# Patient Record
Sex: FEMALE | Race: WHITE | NOT HISPANIC OR LATINO | URBAN - METROPOLITAN AREA
[De-identification: names, ages, dates, MRNs, and addresses within clinical notes are randomized per-mention and may not be internally consistent; named-entity substitution may affect disease eponyms.]

---

## 2019-09-29 ENCOUNTER — EMERGENCY (EMERGENCY)
Facility: HOSPITAL | Age: 79
LOS: 1 days | Discharge: HOME | End: 2019-09-29
Attending: EMERGENCY MEDICINE
Payer: MEDICARE

## 2019-09-29 VITALS
DIASTOLIC BLOOD PRESSURE: 77 MMHG | OXYGEN SATURATION: 97 % | RESPIRATION RATE: 18 BRPM | SYSTOLIC BLOOD PRESSURE: 130 MMHG | TEMPERATURE: 98 F | HEART RATE: 67 BPM

## 2019-09-29 VITALS
OXYGEN SATURATION: 97 % | SYSTOLIC BLOOD PRESSURE: 136 MMHG | DIASTOLIC BLOOD PRESSURE: 76 MMHG | RESPIRATION RATE: 18 BRPM | WEIGHT: 126.1 LBS | TEMPERATURE: 98 F | HEART RATE: 64 BPM | HEIGHT: 65 IN

## 2019-09-29 LAB
ALBUMIN SERPL ELPH-MCNC: 4.4 G/DL — SIGNIFICANT CHANGE UP (ref 3.3–5)
ALP SERPL-CCNC: 95 U/L — SIGNIFICANT CHANGE UP (ref 40–120)
ALT FLD-CCNC: 17 U/L — SIGNIFICANT CHANGE UP (ref 10–45)
ANION GAP SERPL CALC-SCNC: 10 MMOL/L — SIGNIFICANT CHANGE UP (ref 5–17)
APTT BLD: 30.9 SEC — SIGNIFICANT CHANGE UP (ref 27.5–36.3)
AST SERPL-CCNC: 25 U/L — SIGNIFICANT CHANGE UP (ref 10–40)
BASOPHILS # BLD AUTO: 0 K/UL — SIGNIFICANT CHANGE UP (ref 0–0.2)
BASOPHILS NFR BLD AUTO: 0.8 % — SIGNIFICANT CHANGE UP (ref 0–2)
BILIRUB SERPL-MCNC: 0.2 MG/DL — SIGNIFICANT CHANGE UP (ref 0.2–1.2)
BUN SERPL-MCNC: 24 MG/DL — HIGH (ref 7–23)
CALCIUM SERPL-MCNC: 9.8 MG/DL — SIGNIFICANT CHANGE UP (ref 8.4–10.5)
CHLORIDE SERPL-SCNC: 98 MMOL/L — SIGNIFICANT CHANGE UP (ref 96–108)
CO2 SERPL-SCNC: 24 MMOL/L — SIGNIFICANT CHANGE UP (ref 22–31)
CREAT SERPL-MCNC: 0.9 MG/DL — SIGNIFICANT CHANGE UP (ref 0.5–1.3)
EOSINOPHIL # BLD AUTO: 0.1 K/UL — SIGNIFICANT CHANGE UP (ref 0–0.5)
EOSINOPHIL NFR BLD AUTO: 2.2 % — SIGNIFICANT CHANGE UP (ref 0–6)
GLUCOSE SERPL-MCNC: 86 MG/DL — SIGNIFICANT CHANGE UP (ref 70–99)
HCT VFR BLD CALC: 35.9 % — SIGNIFICANT CHANGE UP (ref 34.5–45)
HGB BLD-MCNC: 11.6 G/DL — SIGNIFICANT CHANGE UP (ref 11.5–15.5)
INR BLD: 1.03 RATIO — SIGNIFICANT CHANGE UP (ref 0.88–1.16)
LYMPHOCYTES # BLD AUTO: 2.5 K/UL — SIGNIFICANT CHANGE UP (ref 1–3.3)
LYMPHOCYTES # BLD AUTO: 38.8 % — SIGNIFICANT CHANGE UP (ref 13–44)
MCHC RBC-ENTMCNC: 29.2 PG — SIGNIFICANT CHANGE UP (ref 27–34)
MCHC RBC-ENTMCNC: 32.3 GM/DL — SIGNIFICANT CHANGE UP (ref 32–36)
MCV RBC AUTO: 90.3 FL — SIGNIFICANT CHANGE UP (ref 80–100)
MONOCYTES # BLD AUTO: 0.7 K/UL — SIGNIFICANT CHANGE UP (ref 0–0.9)
MONOCYTES NFR BLD AUTO: 10.9 % — SIGNIFICANT CHANGE UP (ref 2–14)
NEUTROPHILS # BLD AUTO: 3 K/UL — SIGNIFICANT CHANGE UP (ref 1.8–7.4)
NEUTROPHILS NFR BLD AUTO: 47.4 % — SIGNIFICANT CHANGE UP (ref 43–77)
PLATELET # BLD AUTO: 193 K/UL — SIGNIFICANT CHANGE UP (ref 150–400)
POTASSIUM SERPL-MCNC: 4.2 MMOL/L — SIGNIFICANT CHANGE UP (ref 3.5–5.3)
POTASSIUM SERPL-SCNC: 4.2 MMOL/L — SIGNIFICANT CHANGE UP (ref 3.5–5.3)
PROT SERPL-MCNC: 7.3 G/DL — SIGNIFICANT CHANGE UP (ref 6–8.3)
PROTHROM AB SERPL-ACNC: 11.7 SEC — SIGNIFICANT CHANGE UP (ref 10–12.9)
RBC # BLD: 3.97 M/UL — SIGNIFICANT CHANGE UP (ref 3.8–5.2)
RBC # FLD: 12.7 % — SIGNIFICANT CHANGE UP (ref 10.3–14.5)
SODIUM SERPL-SCNC: 132 MMOL/L — LOW (ref 135–145)
WBC # BLD: 6.4 K/UL — SIGNIFICANT CHANGE UP (ref 3.8–10.5)
WBC # FLD AUTO: 6.4 K/UL — SIGNIFICANT CHANGE UP (ref 3.8–10.5)

## 2019-09-29 PROCEDURE — 93971 EXTREMITY STUDY: CPT | Mod: 26

## 2019-09-29 PROCEDURE — 99284 EMERGENCY DEPT VISIT MOD MDM: CPT | Mod: GC

## 2019-09-29 PROCEDURE — 93971 EXTREMITY STUDY: CPT

## 2019-09-29 PROCEDURE — 85610 PROTHROMBIN TIME: CPT

## 2019-09-29 PROCEDURE — 80053 COMPREHEN METABOLIC PANEL: CPT

## 2019-09-29 PROCEDURE — 99284 EMERGENCY DEPT VISIT MOD MDM: CPT | Mod: 25

## 2019-09-29 PROCEDURE — 85027 COMPLETE CBC AUTOMATED: CPT

## 2019-09-29 PROCEDURE — 85730 THROMBOPLASTIN TIME PARTIAL: CPT

## 2019-09-29 NOTE — ED PROVIDER NOTE - OBJECTIVE STATEMENT
78 year old female with PMH varicose veins presents with right calf pain and an area of skin redness x 4 days.  Pt states she noticed pain along her medial calf described as burning, pain scale: 5/10, and worse with palpation.  Denies any recent injury or trauma to the area.  Denies any recent surgery, long flights/immobilization, 78 year old female with PMH varicose veins presents with right calf pain and an area of skin redness x 4 days.  Pt states she noticed pain along her medial calf described as burning, pain scale: 5/10, and worse with palpation.  Denies any recent injury or trauma to the area.  Denies any recent surgery, long flights/immobilization, hormone therapy, smoking history, history of abnormal clotting, or family history of clotting disorder.  Denies any chest pain, shortness of breath, palpitations, pleuritic pain, or fever. Denies any history of similar episodes in the past. Denies taking any over the counter medications.  Denies any additional complaints.

## 2019-09-29 NOTE — ED ADULT NURSE NOTE - OBJECTIVE STATEMENT
Pt is a 77 yo female c/o R calf pain/redness/swelling x 2 days. She denies any bite or injury to the area.

## 2019-09-29 NOTE — ED PROVIDER NOTE - CLINICAL SUMMARY MEDICAL DECISION MAKING FREE TEXT BOX
78 year old female with PMH varicose veins presents with right calf pain and an area of skin redness x 4 days.   No fever, chest pain, shortness of breath, leg swelling.  Likely superficial thrombophlebitis.  Denies any recent surgery, long flights/immobilization, hormone therapy, smoking history, history of abnormal clotting, or family history of clotting disorder. No fevers.  Plan includes labs, ultrasound RLE r/o DVT, and reassessment with disposition accordingly.

## 2019-09-29 NOTE — ED PROVIDER NOTE - ATTENDING CONTRIBUTION TO CARE
77 yo female with atraumatic R leg pain/redness.  appears to be superficial thrombophlebitis of the medial aspect of the RLE on exam.  will get u/s r/o DVT and reassess.

## 2019-09-29 NOTE — ED PROVIDER NOTE - PATIENT PORTAL LINK FT
You can access the FollowMyHealth Patient Portal offered by [facility] by registering at the following website: [email or web address]. By joining [employer]’s FollowMyHealth portal, you will also be able to view your health information using other applications (apps) compatible with our system.

## 2019-09-29 NOTE — ED PROVIDER NOTE - PHYSICAL EXAMINATION
VITAL SIGNS: I have reviewed nursing notes and confirm.  CONSTITUTIONAL: non-toxic, well appearing  SKIN: no rash, no petechiae.  EYES: PERRL, EOMI, pink conjunctiva, anicteric  ENT: tongue and uvular midline, no exudates, moist mucous membranes  NECK: Supple; no meningismus, no JVD  CARD: RRR, no murmurs, equal radial pulses bilaterally 2+  RESP: CTAB, no respiratory distress  ABD: Soft, non-tender, non-distended, no peritoneal signs, no CVA tenderness  EXT: Normal ROM x4. No edema. Area of erythema and induration along varicose vein along right medial calf.  Negative Jitendra's sign bilaterally.  Sensation grossly intact with 2+ DP bilaterally.   NEURO: Alert, oriented. Neuro exam nonfocal, equal strength bilaterally  PSYCH: Cooperative, appropriate.

## 2019-09-29 NOTE — ED PROVIDER NOTE - NSFOLLOWUPINSTRUCTIONS_ED_ALL_ED_FT
Apply warm compresses as discussed.    Take 400 mg ibuprofen every 6 hours with food.    Follow up with your primary care physician this week.    SEEK IMMEDIATE MEDICAL CARE IF YOU HAVE ANY OF THE FOLLOWING SYMPTOMS: shortness of breath, chest pain, rapid or irregular heartbeat, lightheadedness/dizziness, coughing up blood, or any bleeding in your vomit, stool, or urine. These symptoms may represent a serious problem that is an emergency. Do not wait to see if the symptoms will go away. Call 911 and do not drive yourself to the hospital.

## 2019-09-29 NOTE — ED PROVIDER NOTE - NS ED ROS FT
Review of Systems    Constitutional: (-) fever, (-) chills, (-) fatigue  HEENT: (-) sore throat, (-) hearing loss, (-) nasal congestion  Cardiovascular: (-) chest pain, (-) syncope  Respiratory: (-) cough, (-) shortness of breath  Gastrointestinal: (-) vomiting, (-) diarrhea, (-) abdominal pain  Musculoskeletal: (-) neck pain, (-) back pain, (-) joint pain  Integumentary: (+) rash, (-) edema, (-) wound  Neurological: (-) headache, (-) altered mental status    Except as documented in the HPI, all other systems are negative.

## 2021-12-29 ENCOUNTER — EMERGENCY (EMERGENCY)
Facility: HOSPITAL | Age: 81
LOS: 1 days | Discharge: HOME | End: 2021-12-29
Attending: EMERGENCY MEDICINE
Payer: MEDICARE

## 2021-12-29 VITALS
TEMPERATURE: 101 F | OXYGEN SATURATION: 97 % | HEIGHT: 65 IN | RESPIRATION RATE: 18 BRPM | WEIGHT: 115.08 LBS | SYSTOLIC BLOOD PRESSURE: 105 MMHG | HEART RATE: 81 BPM | DIASTOLIC BLOOD PRESSURE: 58 MMHG

## 2021-12-29 LAB
ALBUMIN SERPL ELPH-MCNC: 3.9 G/DL — SIGNIFICANT CHANGE UP (ref 3.3–5)
ALP SERPL-CCNC: 87 U/L — SIGNIFICANT CHANGE UP (ref 40–120)
ALT FLD-CCNC: 19 U/L — SIGNIFICANT CHANGE UP (ref 10–45)
ANION GAP SERPL CALC-SCNC: 12 MMOL/L — SIGNIFICANT CHANGE UP (ref 5–17)
AST SERPL-CCNC: 33 U/L — SIGNIFICANT CHANGE UP (ref 10–40)
BASOPHILS # BLD AUTO: 0.02 K/UL — SIGNIFICANT CHANGE UP (ref 0–0.2)
BASOPHILS NFR BLD AUTO: 0.4 % — SIGNIFICANT CHANGE UP (ref 0–2)
BILIRUB SERPL-MCNC: 0.3 MG/DL — SIGNIFICANT CHANGE UP (ref 0.2–1.2)
BUN SERPL-MCNC: 19 MG/DL — SIGNIFICANT CHANGE UP (ref 7–23)
CALCIUM SERPL-MCNC: 9.5 MG/DL — SIGNIFICANT CHANGE UP (ref 8.4–10.5)
CHLORIDE SERPL-SCNC: 97 MMOL/L — SIGNIFICANT CHANGE UP (ref 96–108)
CO2 SERPL-SCNC: 23 MMOL/L — SIGNIFICANT CHANGE UP (ref 22–31)
CREAT SERPL-MCNC: 0.89 MG/DL — SIGNIFICANT CHANGE UP (ref 0.5–1.3)
EOSINOPHIL # BLD AUTO: 0 K/UL — SIGNIFICANT CHANGE UP (ref 0–0.5)
EOSINOPHIL NFR BLD AUTO: 0 % — SIGNIFICANT CHANGE UP (ref 0–6)
GLUCOSE SERPL-MCNC: 108 MG/DL — HIGH (ref 70–99)
HCT VFR BLD CALC: 32.9 % — LOW (ref 34.5–45)
HGB BLD-MCNC: 10.7 G/DL — LOW (ref 11.5–15.5)
IMM GRANULOCYTES NFR BLD AUTO: 0.7 % — SIGNIFICANT CHANGE UP (ref 0–1.5)
LYMPHOCYTES # BLD AUTO: 1 K/UL — SIGNIFICANT CHANGE UP (ref 1–3.3)
LYMPHOCYTES # BLD AUTO: 22.2 % — SIGNIFICANT CHANGE UP (ref 13–44)
MCHC RBC-ENTMCNC: 28.3 PG — SIGNIFICANT CHANGE UP (ref 27–34)
MCHC RBC-ENTMCNC: 32.5 GM/DL — SIGNIFICANT CHANGE UP (ref 32–36)
MCV RBC AUTO: 87 FL — SIGNIFICANT CHANGE UP (ref 80–100)
MONOCYTES # BLD AUTO: 0.55 K/UL — SIGNIFICANT CHANGE UP (ref 0–0.9)
MONOCYTES NFR BLD AUTO: 12.2 % — SIGNIFICANT CHANGE UP (ref 2–14)
NEUTROPHILS # BLD AUTO: 2.91 K/UL — SIGNIFICANT CHANGE UP (ref 1.8–7.4)
NEUTROPHILS NFR BLD AUTO: 64.5 % — SIGNIFICANT CHANGE UP (ref 43–77)
NRBC # BLD: 0 /100 WBCS — SIGNIFICANT CHANGE UP (ref 0–0)
PLATELET # BLD AUTO: 165 K/UL — SIGNIFICANT CHANGE UP (ref 150–400)
POTASSIUM SERPL-MCNC: 4.6 MMOL/L — SIGNIFICANT CHANGE UP (ref 3.5–5.3)
POTASSIUM SERPL-SCNC: 4.6 MMOL/L — SIGNIFICANT CHANGE UP (ref 3.5–5.3)
PROT SERPL-MCNC: 7 G/DL — SIGNIFICANT CHANGE UP (ref 6–8.3)
RBC # BLD: 3.78 M/UL — LOW (ref 3.8–5.2)
RBC # FLD: 14.1 % — SIGNIFICANT CHANGE UP (ref 10.3–14.5)
SODIUM SERPL-SCNC: 132 MMOL/L — LOW (ref 135–145)
WBC # BLD: 4.51 K/UL — SIGNIFICANT CHANGE UP (ref 3.8–10.5)
WBC # FLD AUTO: 4.51 K/UL — SIGNIFICANT CHANGE UP (ref 3.8–10.5)

## 2021-12-29 PROCEDURE — 99284 EMERGENCY DEPT VISIT MOD MDM: CPT | Mod: CS

## 2021-12-29 PROCEDURE — 93010 ELECTROCARDIOGRAM REPORT: CPT

## 2021-12-29 PROCEDURE — 71045 X-RAY EXAM CHEST 1 VIEW: CPT | Mod: 26

## 2021-12-29 RX ORDER — IBUPROFEN 200 MG
400 TABLET ORAL ONCE
Refills: 0 | Status: COMPLETED | OUTPATIENT
Start: 2021-12-29 | End: 2021-12-29

## 2021-12-29 RX ORDER — ACETAMINOPHEN 500 MG
650 TABLET ORAL ONCE
Refills: 0 | Status: COMPLETED | OUTPATIENT
Start: 2021-12-29 | End: 2021-12-29

## 2021-12-29 RX ORDER — SODIUM CHLORIDE 9 MG/ML
1000 INJECTION, SOLUTION INTRAVENOUS ONCE
Refills: 0 | Status: COMPLETED | OUTPATIENT
Start: 2021-12-29 | End: 2021-12-29

## 2021-12-29 RX ADMIN — Medication 650 MILLIGRAM(S): at 19:36

## 2021-12-29 RX ADMIN — SODIUM CHLORIDE 1000 MILLILITER(S): 9 INJECTION, SOLUTION INTRAVENOUS at 21:34

## 2021-12-29 RX ADMIN — Medication 400 MILLIGRAM(S): at 22:14

## 2021-12-29 RX ADMIN — Medication 400 MILLIGRAM(S): at 19:36

## 2021-12-29 RX ADMIN — Medication 650 MILLIGRAM(S): at 22:14

## 2021-12-29 NOTE — ED PROVIDER NOTE - OBJECTIVE STATEMENT
81 year old female with no sig pmhx presents to ED c/o fever, cough and back pain. Pt reports onset of myalgias approx 1 week ago with "terrible back pain". Pt states that pain comes intermittently and when present she feels SOB. Has been taking Tylenol with some relief. Has had decreased PO intake as well. Daughter with similar symptoms. Denies HA, dizziness, chest pain, abd pain, n/v/d

## 2021-12-29 NOTE — ED ADULT NURSE REASSESSMENT NOTE - NS ED NURSE REASSESS COMMENT FT1
Pt reports slight relief of back pain following administration of tylenols and Motrin as per MD Dean's order- see EMAR. Pt pain was originally 9/10 now 6/10.

## 2021-12-29 NOTE — ED PROVIDER NOTE - PHYSICAL EXAMINATION
CONSTITUTIONAL: Patient is awake, alert and oriented x 3. Patient is well appearing and in no acute distress  HEAD: NCAT  NECK: supple, FROM  LUNGS: CTA B/L, no wheezing or rales   HEART: RRR.+S1S2 no murmurs  ABDOMEN: Soft nd/nttp, no rebound or guarding  EXTREMITY: no edema or calf tenderness b/l, FROM upper and lower ext b/l  SKIN: with no rash or lesions  NEURO: No focal deficits CONSTITUTIONAL: Patient is awake, alert and oriented x 3. Patient is well appearing and in no acute distress  HEAD: NCAT  NECK: supple, FROM  LUNGS: CTA B/L, no wheezing or rales   HEART: RRR.+S1S2 no murmurs  ABDOMEN: Soft nd/nttp, no rebound or guarding  EXTREMITY: no edema or calf tenderness b/l, FROM upper and lower ext b/l  SKIN: with no rash or lesions  NEURO: No focal deficits  Attending Niki Dean: Gen: NAD, heent: atrauamtic, eomi, perrla, mmm, op pink, uvula midline, neck; nttp, no nuchal rigidity, chest: nttp, no crepitus, cv: rrr, no murmurs, lungs: ctab, abd: soft, nontender, nondistended, no peritoneal signs, +BS, no guarding, ext: wwp, no midline spinal ttp, skin: no rash, neuro: awake and alert, following commands, speech clear, sensation and strength intact, no focal deficits

## 2021-12-29 NOTE — ED PROVIDER NOTE - CLINICAL SUMMARY MEDICAL DECISION MAKING FREE TEXT BOX
Attending Niki Dean: 80 yo female not vaccinated presenting with weakness, back pain and myalgias. upon arrival pt well appearing without evidence of respiratory distress. daughter sick with ismilar symptoms. pt complaniing of back pain. no midline ttp, no neuro deficits. less liekly discitis or epidural abscess. high suspicion for covid infection based on history. abdomen soft, no pulstaile mass and ext wwp. with symptoms forlast 7-8 days less likely AAA or dissection. blood work sent and unremarkable. covid did result positive which is likely cause of symptoms. pt improved with antipyretic and hydration in the ed. symptoms for 7 days already. will refer for o/p resources

## 2021-12-29 NOTE — ED PROVIDER NOTE - ATTENDING CONTRIBUTION TO CARE
Attending MD Niki Dean:   I personally have seen and examined this patient.  Physician assistant note reviewed and agree on plan of care and except where noted.  See HPI, PE, and MDM for details.

## 2021-12-29 NOTE — ED PROVIDER NOTE - PROGRESS NOTE DETAILS
Attending Niki Dean: pt feeling better. tolerating po. awaiting covid swab, back pain resolved Attending Niki Dean: pt feeling better. denies any back pain. no chest pain.

## 2021-12-29 NOTE — ED ADULT NURSE NOTE - CAS DISCH CONDITION
After evaluating the patient it has been determined they are at risk for postpartum hemorrhage. Improved

## 2021-12-29 NOTE — ED ADULT NURSE NOTE - OBJECTIVE STATEMENT
81y female PMH of varicose veins to the ED c/o of fevers. Pt endorses symptoms beginning 7 days ago including fevers, cough, and congestion. Pt also endorses back pain rated 9/10 which started with the other symptoms. Pt endorses "It feels like someone is sitting on my back." Pt also endorses stomach pain that comes with the back pain and constipation. Pt also endorses decreased PO intake and weakness. Pt also endorses dyspnea on exertion. Pt is fully vaccinated and boosted for COVID-19. Pt denies any known exposures, vision changes, numbness/tingling. Daughter lives with pt and is also here to be seen in the ER. Daughter states "The fevers with my mother concerened me." 81y female PMH of varicose veins to the ED c/o of fevers. Pt endorses symptoms beginning 7 days ago including fevers, cough, and congestion. Pt also endorses back pain rated 9/10 which started with the other symptoms. Pt endorses "It feels like someone is sitting on my back." Pt also endorses stomach pain that comes with the back pain and constipation. Pt also endorses decreased PO intake and weakness. Pt also endorses dyspnea on exertion. Pt is fully vaccinated and boosted for COVID-19. Pt denies any known exposures, vision changes, numbness/tingling, swelling. Daughter lives with pt and is also here to be seen in the ER. Daughter states "The fevers with my mother concerned me." Safety and comfort maintained, pt in wheelchair with wheels locked, blankets offered for comfort.

## 2021-12-29 NOTE — ED PROVIDER NOTE - PATIENT PORTAL LINK FT
You can access the FollowMyHealth Patient Portal offered by [facility] by registering at the following website: [email or web address]. By joining [employer]’s FollowMyHealth portal, you will also be able to view your health information using other applications (apps) compatible with our system.

## 2021-12-30 VITALS
HEART RATE: 52 BPM | DIASTOLIC BLOOD PRESSURE: 69 MMHG | SYSTOLIC BLOOD PRESSURE: 116 MMHG | TEMPERATURE: 98 F | OXYGEN SATURATION: 96 % | RESPIRATION RATE: 15 BRPM

## 2021-12-30 PROBLEM — I83.90 ASYMPTOMATIC VARICOSE VEINS OF UNSPECIFIED LOWER EXTREMITY: Chronic | Status: ACTIVE | Noted: 2019-09-29

## 2021-12-30 LAB
APPEARANCE UR: ABNORMAL
BACTERIA # UR AUTO: NEGATIVE — SIGNIFICANT CHANGE UP
BILIRUB UR-MCNC: NEGATIVE — SIGNIFICANT CHANGE UP
COLOR SPEC: YELLOW — SIGNIFICANT CHANGE UP
DIFF PNL FLD: NEGATIVE — SIGNIFICANT CHANGE UP
EPI CELLS # UR: 3 /HPF — SIGNIFICANT CHANGE UP
GLUCOSE UR QL: NEGATIVE — SIGNIFICANT CHANGE UP
HYALINE CASTS # UR AUTO: 20 /LPF — HIGH (ref 0–2)
KETONES UR-MCNC: NEGATIVE — SIGNIFICANT CHANGE UP
LEUKOCYTE ESTERASE UR-ACNC: NEGATIVE — SIGNIFICANT CHANGE UP
NITRITE UR-MCNC: NEGATIVE — SIGNIFICANT CHANGE UP
PH UR: 6.5 — SIGNIFICANT CHANGE UP (ref 5–8)
PROT UR-MCNC: ABNORMAL
RBC CASTS # UR COMP ASSIST: 2 /HPF — SIGNIFICANT CHANGE UP (ref 0–4)
SARS-COV-2 RNA SPEC QL NAA+PROBE: DETECTED
SP GR SPEC: 1.03 — HIGH (ref 1.01–1.02)
UROBILINOGEN FLD QL: NEGATIVE — SIGNIFICANT CHANGE UP
WBC UR QL: 4 /HPF — SIGNIFICANT CHANGE UP (ref 0–5)

## 2021-12-30 PROCEDURE — U0005: CPT

## 2021-12-30 PROCEDURE — 93005 ELECTROCARDIOGRAM TRACING: CPT

## 2021-12-30 PROCEDURE — 85025 COMPLETE CBC W/AUTO DIFF WBC: CPT

## 2021-12-30 PROCEDURE — 71045 X-RAY EXAM CHEST 1 VIEW: CPT

## 2021-12-30 PROCEDURE — 99284 EMERGENCY DEPT VISIT MOD MDM: CPT | Mod: 25

## 2021-12-30 PROCEDURE — 87086 URINE CULTURE/COLONY COUNT: CPT

## 2021-12-30 PROCEDURE — U0003: CPT

## 2021-12-30 PROCEDURE — 81001 URINALYSIS AUTO W/SCOPE: CPT

## 2021-12-30 PROCEDURE — 80053 COMPREHEN METABOLIC PANEL: CPT

## 2021-12-30 RX ORDER — ACETAMINOPHEN 500 MG
650 TABLET ORAL ONCE
Refills: 0 | Status: DISCONTINUED | OUTPATIENT
Start: 2021-12-30 | End: 2022-01-02

## 2021-12-30 NOTE — ED POST DISCHARGE NOTE - DETAILS
12/30/21: LM for pt to call back admin line for results. 12/30/21: Spoke with pts daughter, aware of proteinuria. Will f/u and repeat with PMD. Advised adequate hydration. Pt and daughter aware pt tested COVID pos. CDC iso/quarantine guidelines. Orion Harp PA-C 1/4/22 - msg left for daughter, will also advise to repeat UCx. - Martha Guy PA-C 1/4/22 - msg left for daughter, will also advise to repeat UCx. - Martha Guy PA-C.  1/5/22- LVM regarding UCx with daughter CeliaNatividad Nj

## 2022-01-03 LAB
CULTURE RESULTS: SIGNIFICANT CHANGE UP
SPECIMEN SOURCE: SIGNIFICANT CHANGE UP

## 2022-01-06 RX ORDER — CEPHALEXIN 500 MG
1 CAPSULE ORAL
Qty: 10 | Refills: 0
Start: 2022-01-06 | End: 2022-01-10

## 2022-05-16 ENCOUNTER — APPOINTMENT (OUTPATIENT)
Dept: PLASTIC SURGERY | Facility: HOSPITAL | Age: 82
End: 2022-05-16

## 2023-04-17 ENCOUNTER — EMERGENCY (EMERGENCY)
Facility: HOSPITAL | Age: 83
LOS: 1 days | Discharge: HOME | End: 2023-04-17
Attending: EMERGENCY MEDICINE
Payer: MEDICARE

## 2023-04-17 VITALS
TEMPERATURE: 98 F | OXYGEN SATURATION: 99 % | HEIGHT: 64 IN | SYSTOLIC BLOOD PRESSURE: 165 MMHG | WEIGHT: 108.91 LBS | DIASTOLIC BLOOD PRESSURE: 63 MMHG | HEART RATE: 87 BPM | RESPIRATION RATE: 20 BRPM

## 2023-04-17 LAB
ALBUMIN SERPL ELPH-MCNC: 4.3 G/DL — SIGNIFICANT CHANGE UP (ref 3.3–5)
ALP SERPL-CCNC: 88 U/L — SIGNIFICANT CHANGE UP (ref 40–120)
ALT FLD-CCNC: 13 U/L — SIGNIFICANT CHANGE UP (ref 10–45)
ANION GAP SERPL CALC-SCNC: 13 MMOL/L — SIGNIFICANT CHANGE UP (ref 5–17)
APPEARANCE UR: CLEAR — SIGNIFICANT CHANGE UP
APTT BLD: 23.8 SEC — LOW (ref 27.5–35.5)
AST SERPL-CCNC: 25 U/L — SIGNIFICANT CHANGE UP (ref 10–40)
BACTERIA # UR AUTO: NEGATIVE — SIGNIFICANT CHANGE UP
BASE EXCESS BLDV CALC-SCNC: 2.9 MMOL/L — SIGNIFICANT CHANGE UP (ref -2–3)
BASOPHILS # BLD AUTO: 0.03 K/UL — SIGNIFICANT CHANGE UP (ref 0–0.2)
BASOPHILS NFR BLD AUTO: 0.3 % — SIGNIFICANT CHANGE UP (ref 0–2)
BILIRUB SERPL-MCNC: 0.5 MG/DL — SIGNIFICANT CHANGE UP (ref 0.2–1.2)
BILIRUB UR-MCNC: NEGATIVE — SIGNIFICANT CHANGE UP
BUN SERPL-MCNC: 22 MG/DL — SIGNIFICANT CHANGE UP (ref 7–23)
CA-I SERPL-SCNC: 1.31 MMOL/L — SIGNIFICANT CHANGE UP (ref 1.15–1.33)
CALCIUM SERPL-MCNC: 10 MG/DL — SIGNIFICANT CHANGE UP (ref 8.4–10.5)
CHLORIDE BLDV-SCNC: 100 MMOL/L — SIGNIFICANT CHANGE UP (ref 96–108)
CHLORIDE SERPL-SCNC: 98 MMOL/L — SIGNIFICANT CHANGE UP (ref 96–108)
CHOLEST SERPL-MCNC: 212 MG/DL — HIGH
CO2 BLDV-SCNC: 31 MMOL/L — HIGH (ref 22–26)
CO2 SERPL-SCNC: 26 MMOL/L — SIGNIFICANT CHANGE UP (ref 22–31)
COLOR SPEC: YELLOW — SIGNIFICANT CHANGE UP
CREAT SERPL-MCNC: 0.81 MG/DL — SIGNIFICANT CHANGE UP (ref 0.5–1.3)
DIFF PNL FLD: NEGATIVE — SIGNIFICANT CHANGE UP
EGFR: 72 ML/MIN/1.73M2 — SIGNIFICANT CHANGE UP
EOSINOPHIL # BLD AUTO: 0.04 K/UL — SIGNIFICANT CHANGE UP (ref 0–0.5)
EOSINOPHIL NFR BLD AUTO: 0.5 % — SIGNIFICANT CHANGE UP (ref 0–6)
EPI CELLS # UR: 1 /HPF — SIGNIFICANT CHANGE UP
GAS PNL BLDV: 133 MMOL/L — LOW (ref 136–145)
GAS PNL BLDV: SIGNIFICANT CHANGE UP
GAS PNL BLDV: SIGNIFICANT CHANGE UP
GLUCOSE BLDV-MCNC: 92 MG/DL — SIGNIFICANT CHANGE UP (ref 70–99)
GLUCOSE SERPL-MCNC: 92 MG/DL — SIGNIFICANT CHANGE UP (ref 70–99)
GLUCOSE UR QL: NEGATIVE — SIGNIFICANT CHANGE UP
HCO3 BLDV-SCNC: 29 MMOL/L — SIGNIFICANT CHANGE UP (ref 22–29)
HCT VFR BLD CALC: 40.4 % — SIGNIFICANT CHANGE UP (ref 34.5–45)
HCT VFR BLDA CALC: 40 % — SIGNIFICANT CHANGE UP (ref 34.5–46.5)
HDLC SERPL-MCNC: 81 MG/DL — SIGNIFICANT CHANGE UP
HGB BLD CALC-MCNC: 13.2 G/DL — SIGNIFICANT CHANGE UP (ref 11.7–16.1)
HGB BLD-MCNC: 13.1 G/DL — SIGNIFICANT CHANGE UP (ref 11.5–15.5)
HYALINE CASTS # UR AUTO: 6 /LPF — HIGH (ref 0–2)
IMM GRANULOCYTES NFR BLD AUTO: 0.5 % — SIGNIFICANT CHANGE UP (ref 0–0.9)
INR BLD: 1.04 RATIO — SIGNIFICANT CHANGE UP (ref 0.88–1.16)
KETONES UR-MCNC: NEGATIVE — SIGNIFICANT CHANGE UP
LACTATE BLDV-MCNC: 1 MMOL/L — SIGNIFICANT CHANGE UP (ref 0.5–2)
LEUKOCYTE ESTERASE UR-ACNC: NEGATIVE — SIGNIFICANT CHANGE UP
LIPID PNL WITH DIRECT LDL SERPL: 118 MG/DL — HIGH
LYMPHOCYTES # BLD AUTO: 1.11 K/UL — SIGNIFICANT CHANGE UP (ref 1–3.3)
LYMPHOCYTES # BLD AUTO: 12.6 % — LOW (ref 13–44)
MCHC RBC-ENTMCNC: 28.7 PG — SIGNIFICANT CHANGE UP (ref 27–34)
MCHC RBC-ENTMCNC: 32.4 GM/DL — SIGNIFICANT CHANGE UP (ref 32–36)
MCV RBC AUTO: 88.4 FL — SIGNIFICANT CHANGE UP (ref 80–100)
MONOCYTES # BLD AUTO: 0.66 K/UL — SIGNIFICANT CHANGE UP (ref 0–0.9)
MONOCYTES NFR BLD AUTO: 7.5 % — SIGNIFICANT CHANGE UP (ref 2–14)
NEUTROPHILS # BLD AUTO: 6.96 K/UL — SIGNIFICANT CHANGE UP (ref 1.8–7.4)
NEUTROPHILS NFR BLD AUTO: 78.6 % — HIGH (ref 43–77)
NITRITE UR-MCNC: NEGATIVE — SIGNIFICANT CHANGE UP
NON HDL CHOLESTEROL: 131 MG/DL — HIGH
NRBC # BLD: 0 /100 WBCS — SIGNIFICANT CHANGE UP (ref 0–0)
NT-PROBNP SERPL-SCNC: 274 PG/ML — SIGNIFICANT CHANGE UP (ref 0–300)
PCO2 BLDV: 52 MMHG — HIGH (ref 39–42)
PH BLDV: 7.36 — SIGNIFICANT CHANGE UP (ref 7.32–7.43)
PH UR: 6.5 — SIGNIFICANT CHANGE UP (ref 5–8)
PLATELET # BLD AUTO: 205 K/UL — SIGNIFICANT CHANGE UP (ref 150–400)
PO2 BLDV: 26 MMHG — SIGNIFICANT CHANGE UP (ref 25–45)
POTASSIUM BLDV-SCNC: 4.3 MMOL/L — SIGNIFICANT CHANGE UP (ref 3.5–5.1)
POTASSIUM SERPL-MCNC: 4.6 MMOL/L — SIGNIFICANT CHANGE UP (ref 3.5–5.3)
POTASSIUM SERPL-SCNC: 4.6 MMOL/L — SIGNIFICANT CHANGE UP (ref 3.5–5.3)
PROT SERPL-MCNC: 7.7 G/DL — SIGNIFICANT CHANGE UP (ref 6–8.3)
PROT UR-MCNC: ABNORMAL
PROTHROM AB SERPL-ACNC: 12.1 SEC — SIGNIFICANT CHANGE UP (ref 10.5–13.4)
RAPID RVP RESULT: SIGNIFICANT CHANGE UP
RBC # BLD: 4.57 M/UL — SIGNIFICANT CHANGE UP (ref 3.8–5.2)
RBC # FLD: 13.7 % — SIGNIFICANT CHANGE UP (ref 10.3–14.5)
RBC CASTS # UR COMP ASSIST: 2 /HPF — SIGNIFICANT CHANGE UP (ref 0–4)
SAO2 % BLDV: 34.3 % — LOW (ref 67–88)
SARS-COV-2 RNA SPEC QL NAA+PROBE: SIGNIFICANT CHANGE UP
SODIUM SERPL-SCNC: 137 MMOL/L — SIGNIFICANT CHANGE UP (ref 135–145)
SP GR SPEC: 1.02 — SIGNIFICANT CHANGE UP (ref 1.01–1.02)
TRIGL SERPL-MCNC: 64 MG/DL — SIGNIFICANT CHANGE UP
TROPONIN T, HIGH SENSITIVITY RESULT: 7 NG/L — SIGNIFICANT CHANGE UP (ref 0–51)
UROBILINOGEN FLD QL: NEGATIVE — SIGNIFICANT CHANGE UP
WBC # BLD: 8.84 K/UL — SIGNIFICANT CHANGE UP (ref 3.8–10.5)
WBC # FLD AUTO: 8.84 K/UL — SIGNIFICANT CHANGE UP (ref 3.8–10.5)
WBC UR QL: 1 /HPF — SIGNIFICANT CHANGE UP (ref 0–5)

## 2023-04-17 PROCEDURE — 99223 1ST HOSP IP/OBS HIGH 75: CPT | Mod: FS

## 2023-04-17 PROCEDURE — 70450 CT HEAD/BRAIN W/O DYE: CPT | Mod: 26,MA

## 2023-04-17 PROCEDURE — 73070 X-RAY EXAM OF ELBOW: CPT | Mod: 26,RT

## 2023-04-17 PROCEDURE — 71045 X-RAY EXAM CHEST 1 VIEW: CPT | Mod: 26

## 2023-04-17 RX ORDER — IBUPROFEN 200 MG
400 TABLET ORAL ONCE
Refills: 0 | Status: COMPLETED | OUTPATIENT
Start: 2023-04-17 | End: 2023-04-17

## 2023-04-17 RX ORDER — TIMOLOL 0.5 %
1 DROPS OPHTHALMIC (EYE) DAILY
Refills: 0 | Status: DISCONTINUED | OUTPATIENT
Start: 2023-04-17 | End: 2023-04-20

## 2023-04-17 RX ORDER — CITALOPRAM 10 MG/1
20 TABLET, FILM COATED ORAL DAILY
Refills: 0 | Status: DISCONTINUED | OUTPATIENT
Start: 2023-04-17 | End: 2023-04-20

## 2023-04-17 RX ORDER — LATANOPROST 0.05 MG/ML
1 SOLUTION/ DROPS OPHTHALMIC; TOPICAL AT BEDTIME
Refills: 0 | Status: DISCONTINUED | OUTPATIENT
Start: 2023-04-17 | End: 2023-04-20

## 2023-04-17 RX ADMIN — LATANOPROST 1 DROP(S): 0.05 SOLUTION/ DROPS OPHTHALMIC; TOPICAL at 23:50

## 2023-04-17 RX ADMIN — Medication 400 MILLIGRAM(S): at 19:30

## 2023-04-17 RX ADMIN — Medication 400 MILLIGRAM(S): at 18:51

## 2023-04-17 NOTE — ED CDU PROVIDER DISPOSITION NOTE - CLINICAL COURSE
81 yo F with a PMH of with her daughter for an episode of near syncope earlier today.  The patient reports that she woke this morning and began to feel some generalized weakness while pouring herself a glass of water.  She began to get very overheated and diaphoretic at which time her daughter could tell something was not right and sat her down.  Denies chest pain or trouble breathing during episode and reports that the symptoms lasted about 10 minutes and self resolved. No recent febrile illness or URI symptoms as well as chest pain, shortness of breath, abdominal pain, nausea, vomiting, diarrhea, urinary complaints. Of note, pt has also been c/o atraumatic right elbow and forearm pain. No arm weakness or paresthesias. In the ED, pt with stable VS. Labs non actionable, EKG non ischemic, CXR and CTH w/o acute findings. Xray right elbow revealed stable joint effusion and evidence of calcific tendinosis. No fx or dx. Plan for tele and ECHO.  In the CDU*** 83 yo F with a PMH of with her daughter for an episode of near syncope earlier today.  The patient reports that she woke this morning and began to feel some generalized weakness while pouring herself a glass of water.  She began to get very overheated and diaphoretic at which time her daughter could tell something was not right and sat her down.  Denies chest pain or trouble breathing during episode and reports that the symptoms lasted about 10 minutes and self resolved. No recent febrile illness or URI symptoms as well as chest pain, shortness of breath, abdominal pain, nausea, vomiting, diarrhea, urinary complaints. Of note, pt has also been c/o atraumatic right elbow and forearm pain. No arm weakness or paresthesias. In the ED, pt with stable VS. Labs non actionable, EKG non ischemic, CXR and CTH w/o acute findings. Xray right elbow revealed stable joint effusion and evidence of calcific tendinosis. No fx or dx. Plan for tele and ECHO.  In the CDU patient had telemetry monitoring and echo which was WNL. Stable for dc home with cards f/u.

## 2023-04-17 NOTE — ED PROVIDER NOTE - ATTENDING APP SHARED VISIT CONTRIBUTION OF CARE
I performed a history and physical exam of the patient and discussed their management with the ACP. I reviewed the ACP's note and agree with the documented findings and plan of care.  Minnie Palma MD

## 2023-04-17 NOTE — ED CDU PROVIDER DISPOSITION NOTE - NSFOLLOWUPINSTRUCTIONS_ED_ALL_ED_FT
You were seen and evaluated in the ED for syncope.  Please make sure to follow up with your primary care doctor within 1-2 days and your CARDIOLOGY specialist.   Bring a copy of all of your results with you to your follow up appointments.   Return to the ER as discussed if you develop any new or worsening symptoms.    CARDIOLOGY  Dian Pelaez MD  [facility] [address], [place] [address]  Phone: [phone] You were seen and evaluated in the ED for syncope.  Please make sure to follow up with your primary care doctor within 1-2 days and your CARDIOLOGY specialist.   Bring a copy of all of your results with you to your follow up appointments.   Return to the ER as discussed if you develop any new or worsening symptoms.    CARDIOLOGY  Dian Pelaez MD  [facility] [address] [facility] [address], [place] [address]  Phone: [phone]    Rest. Stay hydrated. When you change positions please move slowly.

## 2023-04-17 NOTE — ED PROVIDER NOTE - NSICDXFAMILYHX_GEN_ALL_CORE_FT
Call to mom and left message that an order has been placed for a fasting lab.  Mom can call to schedule or they can go anytime as long as Randolph has not eaten in 8-10 hours.  Mom to call back with further questions or concerns.   FAMILY HISTORY:  No pertinent family history in first degree relatives

## 2023-04-17 NOTE — ED PROVIDER NOTE - CLINICAL SUMMARY MEDICAL DECISION MAKING FREE TEXT BOX
Minnie Palma MD  82-year-old female history of depression presenting to the emergency department via EMS with her daughter for an episode of near syncope earlier today.  The patient reports that she woke this morning and began to feel some generalized weakness while pouring herself a glass of water.  She began to get very overheated and diaphoretic at which time her daughter could tell something was not right and sat her down.  The patient denies chest pain or trouble breathing during this episode and reports that the symptoms lasted about 10 minutes prior to resolution.  The patient denies recent febrile illness or URI symptoms as well as chest pain, shortness of breath, abdominal pain, nausea, vomiting, diarrhea, urinary complaints.  she also notes atraumatic right elbow and forearm pain without focal weakness. on exam, normal neurological exam, concern for syncope, Plan: labs, EKG, reassess.

## 2023-04-17 NOTE — ED CDU PROVIDER INITIAL DAY NOTE - CLINICAL SUMMARY MEDICAL DECISION MAKING FREE TEXT BOX
Minnie Palma MD  83 yo F with a PMH of with her daughter for an episode of near syncope earlier today.  The patient reports that she woke this morning and began to feel some generalized weakness while pouring herself a glass of water.  She began to get very overheated and diaphoretic at which time her daughter could tell something was not right and sat her down.  Denies chest pain or trouble breathing during episode and reports that the symptoms lasted about 10 minutes and self resolved. No recent febrile illness or URI symptoms as well as chest pain, shortness of breath, abdominal pain, nausea, vomiting, diarrhea, urinary complaints. Of note, pt has also been c/o atraumatic right elbow and forearm pain. No arm weakness or paresthesias. CDU for further work up.

## 2023-04-17 NOTE — ED PROVIDER NOTE - NS ED ATTENDING STATEMENT MOD
This was a shared visit with the JIN. I reviewed and verified the documentation and independently performed the documented:

## 2023-04-17 NOTE — ED ADULT TRIAGE NOTE - INTERNATIONAL TRAVEL
[facility] GENERAL PATIENT MESSAGE    Caller name: Fernie    Contact number: [phone]    Relationship to patient: self    On patient contact list? Yes    Provider name:NEURO/NEURMSC PROVIDERS: Dr. Martin Ledezma    Reason for call: Patient is requesting to have Dr Ledezma complete a DMVmedical report so he can drive. His last appt was 5/14/21. He wants to know if he can drop form off to get completed or does he have to see Dr Ledezma first. Please let him know.    Is this an urgent message (e.g., does caller require a response today?): No - use script below    Turnaround time given to caller:   \"This message will be sent to [state provider's full name]. The clinical team will return your call as soon as they review your message. Urgent messages will be returned by the end of the day. Non-urgent messages will be returned within 2 business days.\"   No

## 2023-04-17 NOTE — ED CLERICAL - NSCLERICAL TASK_GEN_ALL_ED
Matt Ellis from [address] [place] [address] called stated someone needs to call #[phone] for approval on auth and state that this is her initial dose   Please advise and call Matt Ellis back at [phone] Pre-Hospital Care Report (PCR)

## 2023-04-17 NOTE — ED CDU PROVIDER INITIAL DAY NOTE - PHYSICAL EXAMINATION
CONSTITUTIONAL: Well appearing and in no apparent distress.  ENT: Airway patent, moist mucous membranes.   EYES: Pupils equal, round and reactive to light. EOMI. Conjunctiva normal appearing.   CARDIAC: Normal rate, regular rhythm.  Heart sounds S1, S2.    RESPIRATORY: Breath sounds clear and equal bilaterally.   GASTROINTESTINAL: Abdomen soft, non-tender, not distended.  MUSCULOSKELETAL: Spine appears normal.  NEUROLOGICAL: Alert and oriented x3, no focal deficits, no motor or sensory deficits. 5/5 muscle strength throughout.  SKIN: Skin normal color, warm, dry and intact.   PSYCHIATRIC: Normal mood and affect. CONSTITUTIONAL: Well appearing and in no apparent distress.  ENT: Airway patent, moist mucous membranes.   EYES: Pupils equal, round and reactive to light. EOMI. Conjunctiva normal appearing.   CARDIAC: Normal rate, regular rhythm.  Heart sounds S1, S2.    RESPIRATORY: Breath sounds clear and equal bilaterally.   GASTROINTESTINAL: Abdomen soft, non-tender, not distended.  MUSCULOSKELETAL: Spine appears normal. Decreased ROM of R elbow 2/2 pain. Small effusion noted w/o TTP.  Sensation and pulse intact. LUE WNL.   NEUROLOGICAL: Alert and oriented x3, no focal deficits, no motor or sensory deficits. 5/5 muscle strength throughout.  SKIN: Skin normal color, warm, dry and intact.   PSYCHIATRIC: Normal mood and affect.

## 2023-04-17 NOTE — ED CDU PROVIDER DISPOSITION NOTE - PATIENT PORTAL LINK FT
You can access the FollowMyHealth Patient Portal offered by [facility] by registering at the following website: [email or web address]. By joining [employer]’s FollowMyHealth portal, you will also be able to view your health information using other applications (apps) compatible with our system.

## 2023-04-17 NOTE — ED CDU PROVIDER INITIAL DAY NOTE - PROGRESS NOTE DETAILS
CDU PROGRESS NOTE MAN CAPUTO: Received pt at 1900 sign-out. Case/plan reviewed. VSS. Pt resting in stretcher in NAD. Pt ate dinner. Ambulatory around unit with steady gait. S1 S2 noted, RRR, lungs CTA b/l, BS x4 with soft, nontender abdomen. Pt without complaints. TTE completed, will continue to monitor, f/u report.

## 2023-04-17 NOTE — ED PROVIDER NOTE - PROGRESS NOTE DETAILS
spoke with patients pmd/cardiologist Dr. Latanya Centeno who was faxed results, agrees with plan for tele and echo and if all WNL will follow up outpatient - Maria Del Carmen Treviño PA-C

## 2023-04-17 NOTE — ED PROVIDER NOTE - OBJECTIVE STATEMENT
82-year-old female history of depression presenting to the emergency department via EMS with her daughter for an episode of near syncope earlier today.  The patient reports that she woke this morning and began to feel some generalized weakness while pouring herself a glass of water.  She began to get very overheated and diaphoretic at which time her daughter could tell something was not right and sat her down.  The patient denies chest pain or trouble breathing during this episode and reports that the symptoms lasted about 10 minutes prior to resolution.  The patient denies recent febrile illness or URI symptoms as well as chest pain, shortness of breath, abdominal pain, nausea, vomiting, diarrhea, urinary complaints.  she also notes atraumatic right elbow and forearm pain without focal weakness.

## 2023-04-17 NOTE — ED CDU PROVIDER INITIAL DAY NOTE - OBJECTIVE STATEMENT
83 yo F with a PMH of with her daughter for an episode of near syncope earlier today.  The patient reports that she woke this morning and began to feel some generalized weakness while pouring herself a glass of water.  She began to get very overheated and diaphoretic at which time her daughter could tell something was not right and sat her down.  Denies chest pain or trouble breathing during episode and reports that the symptoms lasted about 10 minutes and self resolved. No recent febrile illness or URI symptoms as well as chest pain, shortness of breath, abdominal pain, nausea, vomiting, diarrhea, urinary complaints. Of note, pt has also been c/o atraumatic right elbow and forearm pain. No arm weakness or paresthesias.

## 2023-04-17 NOTE — ED CDU PROVIDER DISPOSITION NOTE - ATTENDING APP SHARED VISIT CONTRIBUTION OF CARE
I have personally performed a face to face diagnostic evaluation on this patient.  I have reviewed the ACP note and agree with the history, exam, and plan of care, except as noted.  History and Exam by me shows see CDU sub note. PT browning for cp neg for opt follow up pmd  Jacques Sousa MD, Facep

## 2023-04-17 NOTE — ED ADULT NURSE NOTE - OBJECTIVE STATEMENT
82y f pt arrived via ems; emt reports pt had near syncope episode this morning after waking; pt reports feeling weak, sweaty and warm; daughter was able to place pt to sit on floor; pt aox3; no resp distress; no sob; no chest pain; no abd pain; no n/v/d; pt denies fever/chills; no cough/congestion; had covid in jan per daughter; daughter states episode lasted about 10 mins; iv placed; labs drawn/sent; pt swabbed; pt placed on cardiac monitor in nsr

## 2023-04-18 VITALS
TEMPERATURE: 98 F | OXYGEN SATURATION: 98 % | RESPIRATION RATE: 15 BRPM | DIASTOLIC BLOOD PRESSURE: 67 MMHG | SYSTOLIC BLOOD PRESSURE: 155 MMHG | HEART RATE: 58 BPM

## 2023-04-18 LAB
A1C WITH ESTIMATED AVERAGE GLUCOSE RESULT: 6.2 % — HIGH (ref 4–5.6)
ESTIMATED AVERAGE GLUCOSE: 131 MG/DL — HIGH (ref 68–114)
TROPONIN T, HIGH SENSITIVITY RESULT: 7 NG/L — SIGNIFICANT CHANGE UP (ref 0–51)

## 2023-04-18 PROCEDURE — 81001 URINALYSIS AUTO W/SCOPE: CPT

## 2023-04-18 PROCEDURE — 93306 TTE W/DOPPLER COMPLETE: CPT | Mod: 26

## 2023-04-18 PROCEDURE — 84132 ASSAY OF SERUM POTASSIUM: CPT

## 2023-04-18 PROCEDURE — 83880 ASSAY OF NATRIURETIC PEPTIDE: CPT

## 2023-04-18 PROCEDURE — 85730 THROMBOPLASTIN TIME PARTIAL: CPT

## 2023-04-18 PROCEDURE — 0225U NFCT DS DNA&RNA 21 SARSCOV2: CPT

## 2023-04-18 PROCEDURE — 80053 COMPREHEN METABOLIC PANEL: CPT

## 2023-04-18 PROCEDURE — 83036 HEMOGLOBIN GLYCOSYLATED A1C: CPT

## 2023-04-18 PROCEDURE — 82962 GLUCOSE BLOOD TEST: CPT

## 2023-04-18 PROCEDURE — 85014 HEMATOCRIT: CPT

## 2023-04-18 PROCEDURE — 82435 ASSAY OF BLOOD CHLORIDE: CPT

## 2023-04-18 PROCEDURE — 85610 PROTHROMBIN TIME: CPT

## 2023-04-18 PROCEDURE — 70450 CT HEAD/BRAIN W/O DYE: CPT | Mod: MA

## 2023-04-18 PROCEDURE — 83605 ASSAY OF LACTIC ACID: CPT

## 2023-04-18 PROCEDURE — 99238 HOSP IP/OBS DSCHRG MGMT 30/<: CPT

## 2023-04-18 PROCEDURE — 82330 ASSAY OF CALCIUM: CPT

## 2023-04-18 PROCEDURE — 82947 ASSAY GLUCOSE BLOOD QUANT: CPT

## 2023-04-18 PROCEDURE — 82803 BLOOD GASES ANY COMBINATION: CPT

## 2023-04-18 PROCEDURE — 85025 COMPLETE CBC W/AUTO DIFF WBC: CPT

## 2023-04-18 PROCEDURE — 84484 ASSAY OF TROPONIN QUANT: CPT

## 2023-04-18 PROCEDURE — 87086 URINE CULTURE/COLONY COUNT: CPT

## 2023-04-18 PROCEDURE — 93306 TTE W/DOPPLER COMPLETE: CPT

## 2023-04-18 PROCEDURE — 93005 ELECTROCARDIOGRAM TRACING: CPT

## 2023-04-18 PROCEDURE — 99285 EMERGENCY DEPT VISIT HI MDM: CPT | Mod: 25

## 2023-04-18 PROCEDURE — 71045 X-RAY EXAM CHEST 1 VIEW: CPT

## 2023-04-18 PROCEDURE — 85018 HEMOGLOBIN: CPT

## 2023-04-18 PROCEDURE — 84295 ASSAY OF SERUM SODIUM: CPT

## 2023-04-18 PROCEDURE — 36415 COLL VENOUS BLD VENIPUNCTURE: CPT

## 2023-04-18 PROCEDURE — 80061 LIPID PANEL: CPT

## 2023-04-18 PROCEDURE — G0378: CPT

## 2023-04-18 PROCEDURE — 73070 X-RAY EXAM OF ELBOW: CPT

## 2023-04-18 RX ADMIN — Medication 1 DROP(S): at 08:41

## 2023-04-18 RX ADMIN — CITALOPRAM 20 MILLIGRAM(S): 10 TABLET, FILM COATED ORAL at 08:40

## 2023-04-18 NOTE — ED CDU PROVIDER SUBSEQUENT DAY NOTE - ATTENDING APP SHARED VISIT CONTRIBUTION OF CARE
Elderly female pmh anemia/anxiety pw complains of near syncope while doing morning routine yesterday. Now feels "Fine" no c/p,sob,palps,nvdc. PE Eldelry feamle awake alert normocephalic atraumatic neck supple chest clear anterior & posterior cv no rubs, gallops or murmurs abd soft +bs no mass guarding. Neuro gcs 15 gait normal power 5/5 all ext  Jacques Sousa MD, Facep

## 2023-04-18 NOTE — ED ADULT NURSE REASSESSMENT NOTE - NS ED NURSE REASSESS COMMENT FT1
Received report from REBECCA Dinero. Pt is A&Ox3, breathing spontaneously, unlabored and speaking in full sentences on RA, on CM, NSR. Denies cp/sob/weakness. Pt safety and comfort measures provided.
Pt received from REBECCA Everett. Pt oriented to CDU & plan of care was discussed. Pt A&O x 4. Pt in CDU for tele, echo. Pt denies any chest pain, SOB, dizziness or palpitations as of now. Pt on a cardiac monitor in NSR, HR in 70's. V/S stable, pt afebrile,  IV in place, patent and free of signs of infiltration. Pt resting in bed. Safety & comfort measures maintained. Call bell in reach. Will continue to monitor.

## 2023-04-18 NOTE — ED CDU PROVIDER SUBSEQUENT DAY NOTE - PHYSICAL EXAMINATION
CONSTITUTIONAL: Well appearing and in no apparent distress.  ENT: Airway patent, moist mucous membranes.   EYES: Pupils equal, round and reactive to light. EOMI. Conjunctiva normal appearing.   CARDIAC: Normal rate, regular rhythm.  Heart sounds S1, S2.    RESPIRATORY: Breath sounds clear and equal bilaterally.   GASTROINTESTINAL: Abdomen soft, non-tender, not distended.  MUSCULOSKELETAL: Spine appears normal. Decreased ROM of R elbow 2/2 pain. Small effusion noted w/o TTP.  Sensation and pulse intact. LUE WNL.   NEUROLOGICAL: Alert and oriented x3, no focal deficits, no motor or sensory deficits. 5/5 muscle strength throughout.  SKIN: Skin normal color, warm, dry and intact.   PSYCHIATRIC: Normal mood and affect.

## 2023-04-18 NOTE — ED CDU PROVIDER SUBSEQUENT DAY NOTE - PROGRESS NOTE DETAILS
Patient evaluated at bedside. NAD. VSS. ECHO revealed no acute findings. Called and spoke to patients doctor Dr. Kirkpatrick and informed her of results. She agrees with plan for d/c home and outpatient f/u. Will send repeat troponin and check orthostatics prior to d/c. Yvrose Martinez PA-C CDU attending evaluated at bedside. Troponin negative. Discussed all results and recommendations with pt and daughter at bedside. Stable for d/c. Yvrose Martinez PA-C

## 2023-04-19 LAB
CULTURE RESULTS: SIGNIFICANT CHANGE UP
SPECIMEN SOURCE: SIGNIFICANT CHANGE UP

## 2023-10-20 NOTE — ED ADULT NURSE NOTE - CAS TRG GENERAL AIRWAY, MLM
Patent Island Pedicle Flap With Canthal Suspension Text: The defect edges were debeveled with a #15 scalpel blade.  Given the location of the defect, shape of the defect and the proximity to free margins an island pedicle advancement flap was deemed most appropriate.  Using a sterile surgical marker, an appropriate advancement flap was drawn incorporating the defect, outlining the appropriate donor tissue and placing the expected incisions within the relaxed skin tension lines where possible. The area thus outlined was incised deep to adipose tissue with a #15 scalpel blade.  The skin margins were undermined to an appropriate distance in all directions around the primary defect and laterally outward around the island pedicle utilizing iris scissors.  There was minimal undermining beneath the pedicle flap. A suspension suture was placed in the canthal tendon to prevent tension and prevent ectropion.

## 2024-09-16 NOTE — ED PROVIDER NOTE - NSPTACCESSSVCSAPPTDETAILS_ED_ALL_ED_FT
Pt screened by SW due to pt's age and ISAR score of 2.  SW reviewed the electronic record.  Lue was seen in the ED on 9-13 due to near syncope, lightheadedness, ground level fall, closed head injury, urinary tract infection with hematuria, abrasions of multiple sites and elevated serum creatinine as noted in the ED provider note and discharged home.  No SW follow up indicated.    
[facility] program

## 2024-12-10 NOTE — ED PROVIDER NOTE - NEUROLOGICAL LEVEL OF CONSCIOUSNESS
Goal Outcome Evaluation:         Patient a&o x4, 02@ 2L per humidified NC, Patient is quadraplegic, turn and reposition q2hrs, wound care completed as ordered. Medicated for pain as needed, Patient had low b/p and midodrine given per peg tube. Preventative foam dressings to rashard heels , Continued on IV antibiotics as ordered. All safety maintained and call light in reach. Family at bedside.                                     alert/follows commands